# Patient Record
Sex: FEMALE | Race: WHITE | NOT HISPANIC OR LATINO | URBAN - NONMETROPOLITAN AREA
[De-identification: names, ages, dates, MRNs, and addresses within clinical notes are randomized per-mention and may not be internally consistent; named-entity substitution may affect disease eponyms.]

---

## 2017-05-04 ENCOUNTER — HOSPITAL ENCOUNTER (OUTPATIENT)
Facility: MEDICAL CENTER | Age: 62
End: 2017-05-04
Attending: FAMILY MEDICINE
Payer: COMMERCIAL

## 2017-05-04 ENCOUNTER — OFFICE VISIT (OUTPATIENT)
Dept: URGENT CARE | Facility: PHYSICIAN GROUP | Age: 62
End: 2017-05-04
Payer: COMMERCIAL

## 2017-05-04 VITALS
DIASTOLIC BLOOD PRESSURE: 90 MMHG | HEART RATE: 82 BPM | TEMPERATURE: 97.4 F | OXYGEN SATURATION: 93 % | WEIGHT: 249 LBS | RESPIRATION RATE: 18 BRPM | SYSTOLIC BLOOD PRESSURE: 144 MMHG | BODY MASS INDEX: 38.99 KG/M2

## 2017-05-04 DIAGNOSIS — N30.01 ACUTE CYSTITIS WITH HEMATURIA: ICD-10-CM

## 2017-05-04 LAB
APPEARANCE UR: NORMAL
BILIRUB UR STRIP-MCNC: NORMAL MG/DL
COLOR UR AUTO: NORMAL
GLUCOSE UR STRIP.AUTO-MCNC: 2000 MG/DL
KETONES UR STRIP.AUTO-MCNC: NORMAL MG/DL
LEUKOCYTE ESTERASE UR QL STRIP.AUTO: NORMAL
NITRITE UR QL STRIP.AUTO: NORMAL
PH UR STRIP.AUTO: 6 [PH] (ref 5–8)
PROT UR QL STRIP: NORMAL MG/DL
RBC UR QL AUTO: NORMAL
SP GR UR STRIP.AUTO: 1.01
UROBILINOGEN UR STRIP-MCNC: NORMAL MG/DL

## 2017-05-04 PROCEDURE — 99214 OFFICE O/P EST MOD 30 MIN: CPT | Performed by: FAMILY MEDICINE

## 2017-05-04 PROCEDURE — 87086 URINE CULTURE/COLONY COUNT: CPT

## 2017-05-04 PROCEDURE — 81002 URINALYSIS NONAUTO W/O SCOPE: CPT | Performed by: FAMILY MEDICINE

## 2017-05-04 RX ORDER — HYDROCODONE BITARTRATE AND ACETAMINOPHEN 5; 325 MG/1; MG/1
TABLET ORAL
COMMUNITY
Start: 2017-02-24

## 2017-05-04 RX ORDER — NITROFURANTOIN 25; 75 MG/1; MG/1
100 CAPSULE ORAL EVERY 12 HOURS
Qty: 10 CAP | Refills: 0 | Status: SHIPPED | OUTPATIENT
Start: 2017-05-04 | End: 2017-05-09

## 2017-05-04 RX ORDER — VENLAFAXINE HYDROCHLORIDE 75 MG/1
CAPSULE, EXTENDED RELEASE ORAL
Refills: 3 | COMMUNITY
Start: 2017-02-10

## 2017-05-04 RX ORDER — PHENAZOPYRIDINE HYDROCHLORIDE 200 MG/1
200 TABLET, FILM COATED ORAL 3 TIMES DAILY
Qty: 6 TAB | Refills: 0 | Status: SHIPPED | OUTPATIENT
Start: 2017-05-04 | End: 2017-05-06

## 2017-05-04 RX ORDER — PHENTERMINE HYDROCHLORIDE 37.5 MG/1
TABLET ORAL
COMMUNITY
Start: 2017-04-24

## 2017-05-04 RX ORDER — GABAPENTIN 600 MG/1
TABLET ORAL
Refills: 4 | COMMUNITY
Start: 2017-04-07

## 2017-05-04 RX ORDER — GABAPENTIN 300 MG/1
CAPSULE ORAL
COMMUNITY
Start: 2017-04-24

## 2017-05-04 ASSESSMENT — ENCOUNTER SYMPTOMS
SHORTNESS OF BREATH: 0
DIZZINESS: 0
SWEATS: 0
FEVER: 0
VOMITING: 0
FLANK PAIN: 0
NAUSEA: 0
CHILLS: 0

## 2017-05-04 NOTE — MR AVS SNAPSHOT
Amelia Reyes   2017 9:10 AM   Office Visit   MRN: [id number]    Department:  [facility]   Dept Phone:  [phone]    Description:  Female : 1955   Provider:  Everton Antonio M.D.           Reason for Visit     Dysuria           Allergies as of 2017     No Known Allergies      You were diagnosed with     Acute cystitis with hematuria   [864164]         Vital Signs     Blood Pressure Pulse Temperature Respirations Weight Oxygen Saturation    144/90 mmHg 82 36.3 °C (97.4 °F) 18 112.946 kg (249 lb) 93%    Smoking Status                   Never Smoker            Basic Information     Date Of Birth Sex Race Ethnicity Preferred Language    1955 Female White Non- English      Problem List              ICD-10-CM Priority Class Noted - Resolved    Type II or unspecified type diabetes mellitus without mention of complication, not stated as uncontrolled E11.9 High Chronic 2013 - Present    Vaginal candidiasis B37.3 Medium Chronic 2013 - Present    Obesity E66.9 Medium Chronic 2013 - Present    Hypertension I10 Medium Stage 1 2013 - Present    Hypothyroid E03.9 Medium Chronic 2013 - Present    Osteoarthritis M19.90 Medium Chronic 2013 - Present      Health Maintenance        Date Due Completion Dates    DIABETES MONOFILAMENT / LE EXAM 1955 ---    RETINAL SCREENING 3/20/1974 ---    IMM DTaP/Tdap/Td Vaccine (1 - Tdap) 3/20/1975 ---    IMM PNEUMOCOCCAL 19-64 (ADULT) MEDIUM RISK SERIES (1 of 1 - PPSV23) 3/20/1975 ---    PAP SMEAR 3/20/1977 ---    MAMMOGRAM 3/20/1996 ---    COLONOSCOPY 3/20/2006 ---    A1C SCREENING 2015 10/16/2014, 3/8/2013, 2013, 2012, 2012, 2011    FASTING LIPID PROFILE 10/16/2015 10/16/2014, 2013, 2012, 2011    URINE ACR / MICROALBUMIN 10/16/2015 10/16/2014, 3/8/2013, 2012    SERUM CREATININE 10/16/2015 10/16/2014, 3/8/2013, 2012, 2011    IMM ZOSTER VACCINE 3/20/2016 ---            Current Immunizations     No immunizations on file.      Below and/or attached are the medications your provider expects you to take. Review all of your home medications and newly ordered medications with your provider and/or pharmacist. Follow medication instructions as directed by your provider and/or pharmacist. Please keep your medication list with you and share with your provider. Update the information when medications are discontinued, doses are changed, or new medications (including over-the-counter products) are added; and carry medication information at all times in the event of emergency situations     Allergies:  No Known Allergies          Medications  Valid as of: May 04, 2017 - 10:54 AM    Generic Name Brand Name Tablet Size Instructions for use    Albuterol Sulfate (Aero Soln) albuterol 108 (90 BASE) MCG/ACT Inhale 2 Puffs by mouth every four hours as needed for Shortness of Breath.        DiphenhydrAMINE HCl (Tab) BENADRYL 25 MG Take 25 mg by mouth every 6 hours as needed.        Fluticasone Propionate (Suspension) FLONASE 50 MCG/ACT Spray 1 Spray in nose 2 times a day. Each Nostril        Gabapentin (Cap) NEURONTIN 300 MG         Gabapentin (Tab) NEURONTIN 600 MG TK 1 T PO QID        GlipiZIDE (Tab) GLUCOTROL 10 MG Take 10 mg by mouth 2 times a day.        HydroCHLOROthiazide (Tab) HYDRODIURIL 25 MG Take 25 mg by mouth every day.        Hydrocodone-Acetaminophen (Tab) NORCO 5-325 MG         HydrOXYzine HCl (Tab) ATARAX 50 MG Take 1 Tab by mouth 3 times a day as needed for Itching.        Ibuprofen   by Does not apply route.        Levothyroxine Sodium (Tab) SYNTHROID 125 MCG Take 125 mcg by mouth every day.        Meloxicam (Tab) MOBIC 15 MG Take 1 Tab by mouth every day.        MetFORMIN HCl (Tab) GLUCOPHAGE 500 MG Take 500 mg by mouth 2 times a day, with meals.        Nitrofurantoin Monohyd Macro (Cap) MACROBID 100 MG Take 1 Cap by mouth every 12 hours for 5 days.        Phenazopyridine HCl  (Tab) PYRIDIUM 200 MG Take 1 Tab by mouth 3 times a day for 2 days.        Phentermine HCl (Cap) phentermine 37.5 MG Take 1 Cap by mouth every morning.        Phentermine HCl (Tab) ADIPEX-P 37.5 MG         RaNITidine HCl (Cap) ZANTAC 150 MG Take 1 Cap by mouth 3 times a day.        Venlafaxine HCl (CAPSULE SR 24 HR) EFFEXOR XR 75 MG TAKE 1 TABLET BY MOUTH 1-2X A DAY        .                 Medicines prescribed today were sent to:     [facility] - [place] - [address]    [address] [address]    Phone: [phone] Fax: [phone]    Open 24 Hours?: No    [id number] - [place] - [address] AT [address] [address]    Phone: [phone] Fax: [phone]    Open 24 Hours?: No    [id number] - [place], [place] - [address]    [address] [facility] [address]    Phone: [phone] Fax: [phone]    Open 24 Hours?: No      Medication refill instructions:       If your prescription bottle indicates you have medication refills left, it is not necessary to call your provider’s office. Please contact your pharmacy and they will refill your medication.    If your prescription bottle indicates you do not have any refills left, you may request refills at any time through one of the following ways: The online [employer] system (except Urgent Care), by calling your provider’s office, or by asking your pharmacy to contact your provider’s office with a refill request. Medication refills are processed only during regular business hours and may not be available until the next business day. Your provider may request additional information or to have a follow-up visit with you prior to refilling your medication.   *Please Note: Medication refills are assigned a new Rx number when refilled electronically. Your pharmacy may indicate that no refills were authorized even though a new  prescription for the same medication is available at the pharmacy. Please request the medicine by name with the pharmacy before contacting your provider for a refill.        Your To Do List     Future Labs/Procedures Complete By Expires    Urine Culture  As directed 5/4/2018         [employer] Access Code: [email or web address]  Expires: 6/3/2017 10:54 AM    Your email address is not on file at [facility].  Email Addresses are required for you to sign up for [employer], please contact [phone] to verify your personal information and to provide your email address prior to attempting to register for [employer].    Amelia Reyes  [address] [place] [address]    [employer]  A secure, online tool to manage your health information     [facility]’s [employer]® is a secure, online tool that connects you to your personalized health information from the privacy of your home -- day or night - making it very easy for you to manage your healthcare. Once the activation process is completed, you can even access your medical information using the [employer] priscilla, which is available for free in the Apple Priscilla store or Google Play store.     To learn more about [employer], visit [email or web address]    There are two levels of access available (as shown below):   My Chart Features  [facility] Primary Care Doctor [facility]  Specialists [facility]  Urgent  Care Non-[facility] Primary Care Doctor   Email your healthcare team securely and privately 24/7 X X X    Manage appointments: schedule your next appointment; view details of past/upcoming appointments X      Request prescription refills. X      View recent personal medical records, including lab and immunizations X X X X   View health record, including health history, allergies, medications X X X X   Read reports about your outpatient visits, procedures, consult and ER notes X X X X   See your discharge summary, which is a recap of your hospital and/or ER visit that includes your diagnosis, lab results, and  care plan X X  X     How to register for [employer]:  Once your e-mail address has been verified, follow the following steps to sign up for [employer].     1. Go to  [email or web address]  2. Click on the Sign Up Now box, which takes you to the New Member Sign Up page. You will need to provide the following information:  a. Enter your [employer] Access Code exactly as it appears at the top of this page. (You will not need to use this code after you’ve completed the sign-up process. If you do not sign up before the expiration date, you must request a new code.)   b. Enter your date of birth.   c. Enter your home email address.   d. Click Submit, and follow the next screen’s instructions.  3. Create a rPatht ID. This will be your [employer] login ID and cannot be changed, so think of one that is secure and easy to remember.  4. Create a [employer]Patht password. You can change your password at any time.  5. Enter your Password Reset Question and Answer. This can be used at a later time if you forget your password.   6. Enter your e-mail address. This allows you to receive e-mail notifications when new information is available in [employer].  7. Click Sign Up. You can now view your health information.    For assistance activating your [employer] account, call [phone]

## 2017-05-04 NOTE — PROGRESS NOTES
Subjective:      Amelia Reyes is a 62 y.o. female who presents with Dysuria            Dysuria   This is a recurrent problem. The current episode started in the past 7 days (4 days). The problem occurs every urination. The problem has been gradually improving. The quality of the pain is described as burning (pressure). The pain is at a severity of 4/10. The pain is moderate. There has been no fever. There is no history of pyelonephritis. Associated symptoms include hematuria, hesitancy and urgency. Pertinent negatives include no chills, flank pain, nausea, sweats or vomiting. She has tried antibiotics (had amoxicillin) for the symptoms. The treatment provided moderate relief.       Review of Systems   Constitutional: Negative for fever and chills.   Respiratory: Negative for shortness of breath.    Cardiovascular: Negative for chest pain.   Gastrointestinal: Negative for nausea and vomiting.   Genitourinary: Positive for dysuria, hesitancy, urgency and hematuria. Negative for flank pain.   Neurological: Negative for dizziness.     PMH:  has a past medical history of Depression; Thyroid disease; Hypertension; Obesity; and Postmenopausal.  MEDS:   Current outpatient prescriptions:   •  gabapentin (NEURONTIN) 600 MG tablet, TK 1 T PO QID, Disp: , Rfl: 4  •  hydrocodone-acetaminophen (NORCO) 5-325 MG Tab per tablet, , Disp: , Rfl:   •  phentermine (ADIPEX-P) 37.5 MG tablet, , Disp: , Rfl:   •  venlafaxine XR (EFFEXOR XR) 75 MG CAPSULE SR 24 HR, TAKE 1 TABLET BY MOUTH 1-2X A DAY, Disp: , Rfl: 3  •  metformin (GLUCOPHAGE) 500 MG TABS, Take 500 mg by mouth 2 times a day, with meals., Disp: , Rfl:   •  glipiZIDE (GLUCOTROL) 10 MG TABS, Take 10 mg by mouth 2 times a day., Disp: , Rfl:   •  hydrochlorothiazide (HYDRODIURIL) 25 MG TABS, Take 25 mg by mouth every day., Disp: , Rfl:   •  levothyroxine (SYNTHROID) 125 MCG TABS, Take 125 mcg by mouth every day., Disp: , Rfl:   •  IBUPROFEN, by Does not apply route., Disp: , Rfl:    •  gabapentin (NEURONTIN) 300 MG Cap, , Disp: , Rfl:   •  hydrOXYzine (ATARAX) 50 MG TABS, Take 1 Tab by mouth 3 times a day as needed for Itching., Disp: 30 Tab, Rfl: 0  •  fluticasone (FLONASE) 50 MCG/ACT nasal spray, Spray 1 Spray in nose 2 times a day. Each Nostril, Disp: 1 Bottle, Rfl: 0  •  diphenhydrAMINE (BENADRYL) 25 MG TABS, Take 25 mg by mouth every 6 hours as needed., Disp: , Rfl:   •  ranitidine (ZANTAC) 150 MG capsule, Take 1 Cap by mouth 3 times a day., Disp: 30 Cap, Rfl: 0  •  albuterol (VENTOLIN OR PROVENTIL) 108 (90 BASE) MCG/ACT AERS, Inhale 2 Puffs by mouth every four hours as needed for Shortness of Breath., Disp: 1 Inhaler, Rfl: 3  •  phentermine 37.5 MG capsule, Take 1 Cap by mouth every morning., Disp: 30 Cap, Rfl: 2  •  meloxicam (MOBIC) 15 MG tablet, Take 1 Tab by mouth every day., Disp: 20 Each, Rfl: 0  ALLERGIES: No Known Allergies  SURGHX: History reviewed. No pertinent past surgical history.  SOCHX:  reports that she has never smoked. She does not have any smokeless tobacco history on file. She reports that she does not drink alcohol or use illicit drugs.  FH: Family history was reviewed, no pertinent findings to report        Objective:     /90 mmHg  Pulse 82  Temp(Src) 36.3 °C (97.4 °F)  Resp 18  Wt 112.946 kg (249 lb)  SpO2 93%     Physical Exam   Constitutional: She appears well-developed. No distress.   HENT:   Head: Normocephalic.   Cardiovascular: Normal rate, regular rhythm and normal heart sounds.  Exam reveals no friction rub.    No murmur heard.  Pulmonary/Chest: Effort normal. No respiratory distress. She has no wheezes.   Abdominal: Soft. She exhibits no distension. There is tenderness. There is no rebound and no guarding.   POSITIVE suprapubic tenderness, NO CVAT, No organomegaly   Neurological: She is alert.   Skin: Skin is dry. No rash noted. She is not diaphoretic.   Psychiatric: She has a normal mood and affect. Her behavior is normal.                Assessment/Plan:     1. Acute cystitis with hematuria [N30.01]  Urine Culture    nitrofurantoin monohydr macro (MACROBID) 100 MG Cap    phenazopyridine (PYRIDIUM) 200 MG Tab     Took some amoxicillin she had left over at home  Advised against doing this in the future  partially treated UTI   No prior resistance issues with abx treatment for her recurent UTIs    Supportive care  Push fluids  Monitor temperature  Follow-up if symptoms worsen or fail to improve

## 2017-05-06 LAB
BACTERIA UR CULT: NORMAL
SIGNIFICANT IND 70042: NORMAL
SOURCE SOURCE: NORMAL

## 2020-01-07 ENCOUNTER — TELEPHONE (OUTPATIENT)
Dept: VASCULAR LAB | Facility: MEDICAL CENTER | Age: 65
End: 2020-01-07

## 2020-01-07 NOTE — TELEPHONE ENCOUNTER
Called and spoke to patient to schedule her initial appointment with Dr. Canela, patient advised that she already had surgery and to quit calling.

## 2020-02-05 ENCOUNTER — TELEPHONE (OUTPATIENT)
Dept: VASCULAR LAB | Facility: MEDICAL CENTER | Age: 65
End: 2020-02-05

## 2020-02-05 NOTE — TELEPHONE ENCOUNTER
Patient referred for evaluation in vascular medicine clinic.  When reached by her schedule she refused to be seen and said she was already seeing a surgeon.    Pending for the patient contact, we will defer all further vascular care to her PCP and vascular surgeon    Michael Bloch, MD  Vascular Medicine    CC:  KIMBER Telles

## 2021-06-16 ENCOUNTER — HOSPITAL ENCOUNTER (OUTPATIENT)
Dept: LAB | Facility: MEDICAL CENTER | Age: 66
End: 2021-06-16
Attending: INTERNAL MEDICINE
Payer: COMMERCIAL

## 2021-06-16 LAB
CREAT UR-MCNC: 43.93 MG/DL
MICROALBUMIN UR-MCNC: 1.6 MG/DL
MICROALBUMIN/CREAT UR: 36 MG/G (ref 0–30)

## 2021-06-16 PROCEDURE — 80048 BASIC METABOLIC PNL TOTAL CA: CPT

## 2021-06-16 PROCEDURE — 36415 COLL VENOUS BLD VENIPUNCTURE: CPT

## 2021-06-16 PROCEDURE — 82570 ASSAY OF URINE CREATININE: CPT

## 2021-06-16 PROCEDURE — 83036 HEMOGLOBIN GLYCOSYLATED A1C: CPT

## 2021-06-16 PROCEDURE — 84443 ASSAY THYROID STIM HORMONE: CPT

## 2021-06-16 PROCEDURE — 82043 UR ALBUMIN QUANTITATIVE: CPT

## 2021-06-16 PROCEDURE — 80061 LIPID PANEL: CPT

## 2021-06-17 LAB
ANION GAP SERPL CALC-SCNC: 15 MMOL/L (ref 7–16)
BUN SERPL-MCNC: 19 MG/DL (ref 8–22)
CALCIUM SERPL-MCNC: 9.2 MG/DL (ref 8.5–10.5)
CHLORIDE SERPL-SCNC: 99 MMOL/L (ref 96–112)
CHOLEST SERPL-MCNC: 263 MG/DL (ref 100–199)
CO2 SERPL-SCNC: 25 MMOL/L (ref 20–33)
CREAT SERPL-MCNC: 0.82 MG/DL (ref 0.5–1.4)
EST. AVERAGE GLUCOSE BLD GHB EST-MCNC: 214 MG/DL
FASTING STATUS PATIENT QL REPORTED: NORMAL
GLUCOSE SERPL-MCNC: 299 MG/DL (ref 65–99)
HBA1C MFR BLD: 9.1 % (ref 4–5.6)
HDLC SERPL-MCNC: 39 MG/DL
LDLC SERPL CALC-MCNC: ABNORMAL MG/DL
POTASSIUM SERPL-SCNC: 3.8 MMOL/L (ref 3.6–5.5)
SODIUM SERPL-SCNC: 139 MMOL/L (ref 135–145)
TRIGL SERPL-MCNC: 421 MG/DL (ref 0–149)
TSH SERPL DL<=0.005 MIU/L-ACNC: 2.4 UIU/ML (ref 0.38–5.33)